# Patient Record
Sex: FEMALE | Race: WHITE | NOT HISPANIC OR LATINO | Employment: UNEMPLOYED | URBAN - METROPOLITAN AREA
[De-identification: names, ages, dates, MRNs, and addresses within clinical notes are randomized per-mention and may not be internally consistent; named-entity substitution may affect disease eponyms.]

---

## 2023-07-01 ENCOUNTER — HOSPITAL ENCOUNTER (EMERGENCY)
Facility: HOSPITAL | Age: 28
Discharge: HOME | End: 2023-07-01
Attending: EMERGENCY MEDICINE | Admitting: EMERGENCY MEDICINE
Payer: COMMERCIAL

## 2023-07-01 VITALS
DIASTOLIC BLOOD PRESSURE: 73 MMHG | TEMPERATURE: 99 F | OXYGEN SATURATION: 98 % | SYSTOLIC BLOOD PRESSURE: 135 MMHG | RESPIRATION RATE: 18 BRPM | HEART RATE: 99 BPM

## 2023-07-01 DIAGNOSIS — T63.441A LOCAL REACTION TO BEE STING: Primary | ICD-10-CM

## 2023-07-01 PROCEDURE — 99284 EMERGENCY DEPT VISIT MOD MDM: CPT | Performed by: PHYSICIAN ASSISTANT

## 2023-07-01 PROCEDURE — 99282 EMERGENCY DEPT VISIT SF MDM: CPT

## 2023-07-01 RX ORDER — CEPHALEXIN 500 MG/1
500 CAPSULE ORAL 4 TIMES DAILY
Qty: 28 CAPSULE | Refills: 0 | Status: SHIPPED | OUTPATIENT
Start: 2023-07-01 | End: 2023-07-08

## 2023-07-02 NOTE — ED PROVIDER NOTES
History  Chief Complaint   Patient presents with   • Bee Sting     Pt arrived ambulatory with c/o being stung on her left arm last Sunday. Pt was concerned that it is irritated again     This is a 77-year-old female patient presenting for evaluation of a bee sting to her left upper extremity. She was stung there 6 days ago, had swelling afterwards which had gotten better until tonight when it seemed to suddenly get worse. She states it was more swollen, red but not painful. It was itchy. The swelling has since improved and looks much better now per her report. She denies any fevers chills nausea vomiting. There is no trauma to the area tonight. There is no repeat sting tonight. She has been resting comfortably since then. She states she just wanted to make sure she was not having a severe allergic reaction as her brother has anaphylaxis to bee stings. History provided by:  Patient   used: No        None       History reviewed. No pertinent past medical history. History reviewed. No pertinent surgical history. History reviewed. No pertinent family history. I have reviewed and agree with the history as documented. E-Cigarette/Vaping     E-Cigarette/Vaping Substances     Social History     Tobacco Use   • Smoking status: Never   • Smokeless tobacco: Never   Substance Use Topics   • Alcohol use: Not Currently   • Drug use: Never       Review of Systems   Constitutional: Negative for chills and fever. HENT: Negative for ear pain and sore throat. Eyes: Negative for pain and visual disturbance. Respiratory: Negative for cough and shortness of breath. Cardiovascular: Negative for chest pain and palpitations. Gastrointestinal: Negative for abdominal pain and vomiting. Genitourinary: Negative for dysuria and hematuria. Musculoskeletal: Negative for arthralgias and back pain. Skin: Positive for rash. Negative for color change and wound.    Neurological: Negative for seizures and syncope. All other systems reviewed and are negative. Physical Exam  Physical Exam  Vitals and nursing note reviewed. Constitutional:       General: She is not in acute distress. Appearance: She is well-developed. HENT:      Head: Normocephalic and atraumatic. Eyes:      Conjunctiva/sclera: Conjunctivae normal.   Cardiovascular:      Rate and Rhythm: Normal rate and regular rhythm. Heart sounds: No murmur heard. Pulmonary:      Effort: Pulmonary effort is normal. No respiratory distress. Breath sounds: Normal breath sounds. Abdominal:      Palpations: Abdomen is soft. Tenderness: There is no abdominal tenderness. Musculoskeletal:         General: No swelling. Cervical back: Neck supple. Skin:     General: Skin is warm and dry. Capillary Refill: Capillary refill takes less than 2 seconds. Neurological:      Mental Status: She is alert. Psychiatric:         Mood and Affect: Mood normal.         Vital Signs  ED Triage Vitals [07/01/23 2157]   Temperature Pulse Respirations Blood Pressure SpO2   99 °F (37.2 °C) 99 18 135/73 98 %      Temp Source Heart Rate Source Patient Position - Orthostatic VS BP Location FiO2 (%)   Oral Monitor Sitting Right arm --      Pain Score       --           Vitals:    07/01/23 2157   BP: 135/73   Pulse: 99   Patient Position - Orthostatic VS: Sitting         Visual Acuity      ED Medications  Medications - No data to display    Diagnostic Studies  Results Reviewed     None                 No orders to display              Procedures  Procedures         ED Course                               SBIRT 20yo+    Flowsheet Row Most Recent Value   Initial Alcohol Screen: US AUDIT-C     1. How often do you have a drink containing alcohol? 0 Filed at: 07/01/2023 2157   2. How many drinks containing alcohol do you have on a typical day you are drinking? 0 Filed at: 07/01/2023 2157   3b. FEMALE Any Age, or MALE 65+:  How often do you have 4 or more drinks on one occassion? 0 Filed at: 07/01/2023 2157   Audit-C Score 0 Filed at: 07/01/2023 2157   NA: How many times in the past year have you. .. Used an illegal drug or used a prescription medication for non-medical reasons? Never Filed at: 07/01/2023 2157                    Medical Decision Making  Differential diagnosis includes but is not limited to allergic reaction, cellulitis, doubt abscess. Plan/medical decision making: Suspect localized skin reaction from bug bite. She has no pain fever and with the rash already improving, doubt infection. We discussed wait-and-see prescription for antibiotics. We discussed continued Benadryl as needed. Discussed return parameters. Patient understands and agrees with this plan. Risk  Prescription drug management. Disposition  Final diagnoses:   Local reaction to bee sting     Time reflects when diagnosis was documented in both MDM as applicable and the Disposition within this note     Time User Action Codes Description Comment    7/1/2023 10:26 PM Iban Fernandez Add [M94.032O] Local reaction to bee sting       ED Disposition     ED Disposition   Discharge    Condition   Stable    Date/Time   Sat Jul 1, 2023 10:26 PM    Comment   Francine Escobar discharge to home/self care. Follow-up Information     Follow up With Specialties Details Why Contact Info    Kody Manley, DO Family Medicine Call  As needed [address] [place] [address]  [phone]            Discharge Medication List as of 7/1/2023 10:26 PM      START taking these medications    Details   cephalexin (Keflex) 500 mg capsule Take 1 capsule (500 mg total) by mouth 4 (four) times a day for 7 days, Starting Sat 7/1/2023, Until Sat 7/8/2023, Print             No discharge procedures on file.     PDMP Review     None          ED Provider  Electronically Signed by           Iban Fernandez PA-C  07/01/23 1694

## 2023-12-14 ENCOUNTER — HOSPITAL ENCOUNTER (EMERGENCY)
Facility: HOSPITAL | Age: 28
Discharge: HOME | End: 2023-12-14
Attending: EMERGENCY MEDICINE
Payer: COMMERCIAL

## 2023-12-14 ENCOUNTER — APPOINTMENT (EMERGENCY)
Dept: RADIOLOGY | Facility: HOSPITAL | Age: 28
End: 2023-12-14
Payer: COMMERCIAL

## 2023-12-14 VITALS
OXYGEN SATURATION: 98 % | HEART RATE: 87 BPM | SYSTOLIC BLOOD PRESSURE: 118 MMHG | TEMPERATURE: 98.4 F | DIASTOLIC BLOOD PRESSURE: 76 MMHG | RESPIRATION RATE: 14 BRPM | WEIGHT: 209 LBS

## 2023-12-14 DIAGNOSIS — R06.02 SOB (SHORTNESS OF BREATH): ICD-10-CM

## 2023-12-14 DIAGNOSIS — R05.9 COUGH: Primary | ICD-10-CM

## 2023-12-14 LAB
ANION GAP SERPL CALCULATED.3IONS-SCNC: 5 MMOL/L
BASOPHILS # BLD AUTO: 0.04 THOUSANDS/ÂΜL (ref 0–0.1)
BASOPHILS NFR BLD AUTO: 1 % (ref 0–1)
BNP SERPL-MCNC: 9 PG/ML (ref 0–100)
BUN SERPL-MCNC: 13 MG/DL (ref 5–25)
CALCIUM SERPL-MCNC: 9.3 MG/DL (ref 8.4–10.2)
CARDIAC TROPONIN I PNL SERPL HS: <2 NG/L
CHLORIDE SERPL-SCNC: 107 MMOL/L (ref 96–108)
CO2 SERPL-SCNC: 27 MMOL/L (ref 21–32)
CREAT SERPL-MCNC: 1.27 MG/DL (ref 0.6–1.3)
D DIMER PPP FEU-MCNC: 0.47 UG/ML FEU
EOSINOPHIL # BLD AUTO: 0.22 THOUSAND/ÂΜL (ref 0–0.61)
EOSINOPHIL NFR BLD AUTO: 3 % (ref 0–6)
ERYTHROCYTE [DISTWIDTH] IN BLOOD BY AUTOMATED COUNT: 13.2 % (ref 11.6–15.1)
EXT PREGNANCY TEST URINE: NEGATIVE
EXT. CONTROL: NORMAL
FLUAV RNA RESP QL NAA+PROBE: NEGATIVE
FLUBV RNA RESP QL NAA+PROBE: NEGATIVE
GFR SERPL CREATININE-BSD FRML MDRD: 57 ML/MIN/1.73SQ M
GLUCOSE SERPL-MCNC: 88 MG/DL (ref 65–140)
HCT VFR BLD AUTO: 41.1 % (ref 34.8–46.1)
HGB BLD-MCNC: 13.8 G/DL (ref 11.5–15.4)
IMM GRANULOCYTES # BLD AUTO: 0.01 THOUSAND/UL (ref 0–0.2)
IMM GRANULOCYTES NFR BLD AUTO: 0 % (ref 0–2)
LYMPHOCYTES # BLD AUTO: 3.01 THOUSANDS/ÂΜL (ref 0.6–4.47)
LYMPHOCYTES NFR BLD AUTO: 34 % (ref 14–44)
MCH RBC QN AUTO: 30.5 PG (ref 26.8–34.3)
MCHC RBC AUTO-ENTMCNC: 33.6 G/DL (ref 31.4–37.4)
MCV RBC AUTO: 91 FL (ref 82–98)
MONOCYTES # BLD AUTO: 0.75 THOUSAND/ÂΜL (ref 0.17–1.22)
MONOCYTES NFR BLD AUTO: 9 % (ref 4–12)
NEUTROPHILS # BLD AUTO: 4.71 THOUSANDS/ÂΜL (ref 1.85–7.62)
NEUTS SEG NFR BLD AUTO: 53 % (ref 43–75)
NRBC BLD AUTO-RTO: 0 /100 WBCS
PLATELET # BLD AUTO: 318 THOUSANDS/UL (ref 149–390)
PMV BLD AUTO: 8.4 FL (ref 8.9–12.7)
POTASSIUM SERPL-SCNC: 3.6 MMOL/L (ref 3.5–5.3)
RBC # BLD AUTO: 4.53 MILLION/UL (ref 3.81–5.12)
RSV RNA RESP QL NAA+PROBE: NEGATIVE
SARS-COV-2 RNA RESP QL NAA+PROBE: NEGATIVE
SODIUM SERPL-SCNC: 139 MMOL/L (ref 135–147)
WBC # BLD AUTO: 8.74 THOUSAND/UL (ref 4.31–10.16)

## 2023-12-14 PROCEDURE — 81025 URINE PREGNANCY TEST: CPT

## 2023-12-14 PROCEDURE — 94640 AIRWAY INHALATION TREATMENT: CPT

## 2023-12-14 PROCEDURE — 85025 COMPLETE CBC W/AUTO DIFF WBC: CPT

## 2023-12-14 PROCEDURE — 83880 ASSAY OF NATRIURETIC PEPTIDE: CPT

## 2023-12-14 PROCEDURE — 80048 BASIC METABOLIC PNL TOTAL CA: CPT

## 2023-12-14 PROCEDURE — 36415 COLL VENOUS BLD VENIPUNCTURE: CPT

## 2023-12-14 PROCEDURE — 96374 THER/PROPH/DIAG INJ IV PUSH: CPT

## 2023-12-14 PROCEDURE — C9113 INJ PANTOPRAZOLE SODIUM, VIA: HCPCS

## 2023-12-14 PROCEDURE — 85379 FIBRIN DEGRADATION QUANT: CPT

## 2023-12-14 PROCEDURE — 84484 ASSAY OF TROPONIN QUANT: CPT

## 2023-12-14 PROCEDURE — 93005 ELECTROCARDIOGRAM TRACING: CPT

## 2023-12-14 PROCEDURE — 99283 EMERGENCY DEPT VISIT LOW MDM: CPT

## 2023-12-14 PROCEDURE — 71046 X-RAY EXAM CHEST 2 VIEWS: CPT

## 2023-12-14 PROCEDURE — 0241U HB NFCT DS VIR RESP RNA 4 TRGT: CPT

## 2023-12-14 RX ORDER — PANTOPRAZOLE SODIUM 40 MG/10ML
40 INJECTION, POWDER, LYOPHILIZED, FOR SOLUTION INTRAVENOUS ONCE
Status: COMPLETED | OUTPATIENT
Start: 2023-12-14 | End: 2023-12-14

## 2023-12-14 RX ORDER — FAMOTIDINE 20 MG/1
20 TABLET, FILM COATED ORAL ONCE
Status: COMPLETED | OUTPATIENT
Start: 2023-12-14 | End: 2023-12-14

## 2023-12-14 RX ORDER — ACETAMINOPHEN 325 MG/1
650 TABLET ORAL ONCE
Status: COMPLETED | OUTPATIENT
Start: 2023-12-14 | End: 2023-12-14

## 2023-12-14 RX ORDER — ALBUTEROL SULFATE 90 UG/1
2 AEROSOL, METERED RESPIRATORY (INHALATION) EVERY 6 HOURS PRN
Qty: 8.5 G | Refills: 0 | Status: SHIPPED | OUTPATIENT
Start: 2023-12-14

## 2023-12-14 RX ORDER — IPRATROPIUM BROMIDE AND ALBUTEROL SULFATE 2.5; .5 MG/3ML; MG/3ML
3 SOLUTION RESPIRATORY (INHALATION) ONCE
Status: COMPLETED | OUTPATIENT
Start: 2023-12-14 | End: 2023-12-14

## 2023-12-14 RX ADMIN — PANTOPRAZOLE SODIUM 40 MG: 40 INJECTION, POWDER, FOR SOLUTION INTRAVENOUS at 22:03

## 2023-12-14 RX ADMIN — ACETAMINOPHEN 650 MG: 325 TABLET, FILM COATED ORAL at 22:01

## 2023-12-14 RX ADMIN — FAMOTIDINE 20 MG: 20 TABLET ORAL at 22:01

## 2023-12-14 RX ADMIN — IPRATROPIUM BROMIDE AND ALBUTEROL SULFATE 3 ML: 2.5; .5 SOLUTION RESPIRATORY (INHALATION) at 22:04

## 2023-12-15 LAB
ATRIAL RATE: 81 BPM
P AXIS: 55 DEGREES
PR INTERVAL: 198 MS
QRS AXIS: 93 DEGREES
QRSD INTERVAL: 76 MS
QT INTERVAL: 352 MS
QTC INTERVAL: 408 MS
T WAVE AXIS: 54 DEGREES
VENTRICULAR RATE: 81 BPM

## 2023-12-15 NOTE — ED PROVIDER NOTES
History  Chief Complaint   Patient presents with    Cough     Pt with dry cough, chest "burning" and sob x 4 days. Denies fevers. Patient is a 49-year-old female who presents today for 4 days of symptoms. Patient reports chest pain described as a burning sensation with associated shortness of breath and dry cough. Reports eating tacos prior to ED arrival. Denies any other symptoms at this time. Has not tried anything for symptom relief. Patient denies sick contacts. Denies cardiac, pulmonary history. Denies history of asthma. Cough  Associated symptoms: chest pain (Burning sensation) and shortness of breath    Associated symptoms: no chills, no ear pain, no fever, no headaches, no rash and no sore throat        None       History reviewed. No pertinent past medical history. History reviewed. No pertinent surgical history. History reviewed. No pertinent family history. I have reviewed and agree with the history as documented. E-Cigarette/Vaping     E-Cigarette/Vaping Substances     Social History     Tobacco Use    Smoking status: Never    Smokeless tobacco: Never   Substance Use Topics    Alcohol use: Yes     Comment: occ    Drug use: Never       Review of Systems   Constitutional:  Negative for chills and fever. HENT:  Negative for ear pain, sore throat, trouble swallowing and voice change. Eyes:  Negative for photophobia, pain and visual disturbance. Respiratory:  Positive for cough and shortness of breath. Cardiovascular:  Positive for chest pain (Burning sensation). Negative for palpitations. Gastrointestinal:  Negative for abdominal pain, nausea and vomiting. Genitourinary:  Negative for dysuria, flank pain and hematuria. Musculoskeletal:  Negative for arthralgias and back pain. Skin:  Negative for color change and rash. Neurological:  Negative for dizziness, seizures, syncope, weakness and headaches. Psychiatric/Behavioral:  Negative for confusion.     All other systems reviewed and are negative. Physical Exam  Physical Exam  Vitals and nursing note reviewed. Constitutional:       General: She is not in acute distress. Appearance: She is well-developed. HENT:      Head: Normocephalic and atraumatic. Mouth/Throat:      Mouth: Mucous membranes are moist.   Eyes:      Conjunctiva/sclera: Conjunctivae normal.   Cardiovascular:      Rate and Rhythm: Regular rhythm. Tachycardia present. Pulses: Normal pulses. Heart sounds: No murmur heard. Pulmonary:      Effort: Pulmonary effort is normal. No respiratory distress. Breath sounds: Decreased air movement (Bilateral) present. Wheezing (Right sided) present. Abdominal:      Palpations: Abdomen is soft. Tenderness: There is no abdominal tenderness. Musculoskeletal:         General: No swelling. Cervical back: Neck supple. Skin:     General: Skin is warm and dry. Capillary Refill: Capillary refill takes less than 2 seconds. Neurological:      Mental Status: She is alert.    Psychiatric:         Mood and Affect: Mood normal.         Vital Signs  ED Triage Vitals   Temperature Pulse Respirations Blood Pressure SpO2   12/14/23 2105 12/14/23 2102 12/14/23 2102 12/14/23 2102 12/14/23 2102   98.4 °F (36.9 °C) 98 20 144/75 98 %      Temp Source Heart Rate Source Patient Position - Orthostatic VS BP Location FiO2 (%)   12/14/23 2105 12/14/23 2102 12/14/23 2102 12/14/23 2102 --   Oral Monitor Sitting Left arm       Pain Score       12/14/23 2201       7           Vitals:    12/14/23 2102 12/14/23 2213   BP: 144/75 116/71   Pulse: 98 76   Patient Position - Orthostatic VS: Sitting Lying         Visual Acuity      ED Medications  Medications   acetaminophen (TYLENOL) tablet 650 mg (650 mg Oral Given 12/14/23 2201)   ipratropium-albuterol (DUO-NEB) 0.5-2.5 mg/3 mL inhalation solution 3 mL (3 mL Nebulization Given 12/14/23 2204)   famotidine (PEPCID) tablet 20 mg (20 mg Oral Given 12/14/23 2201)   pantoprazole (PROTONIX) injection 40 mg (40 mg Intravenous Given 12/14/23 2203)       Diagnostic Studies  Results Reviewed       Procedure Component Value Units Date/Time    FLU/RSV/COVID - if FLU/RSV clinically relevant [[id number]]  (Normal) Collected: 12/14/23 2154    Lab Status: Final result Specimen: Nares from Nose Updated: 12/14/23 2236     SARS-CoV-2 Negative     INFLUENZA A PCR Negative     INFLUENZA B PCR Negative     RSV PCR Negative    Narrative:      FOR PEDIATRIC PATIENTS - copy/paste COVID Guidelines URL to browser: [email or web address]    SARS-CoV-2 assay is a Nucleic Acid Amplification assay intended for the  qualitative detection of nucleic acid from SARS-CoV-2 in nasopharyngeal  swabs. Results are for the presumptive identification of SARS-CoV-2 RNA. Positive results are indicative of infection with SARS-CoV-2, the virus  causing COVID-19, but do not rule out bacterial infection or co-infection  with other viruses. Laboratories within the [facility] hospital and its  territories are required to report all positive results to the appropriate  public health authorities. Negative results do not preclude SARS-CoV-2  infection and should not be used as the sole basis for treatment or other  patient management decisions. Negative results must be combined with  clinical observations, patient history, and epidemiological information. This test has not been FDA cleared or approved. This test has been authorized by FDA under an Emergency Use Authorization  (EUA). This test is only authorized for the duration of time the  declaration that circumstances exist justifying the authorization of the  emergency use of an in vitro diagnostic tests for detection of SARS-CoV-2  virus and/or diagnosis of COVID-19 infection under section 564(b)(1) of  the Act, 21 U. S.C. 678OZC-4(L)(7), unless the authorization is terminated  or revoked sooner.  The test has been validated but independent review by FDA  and CLIA is pending. Test performed using [employer] GeneXpert: This RT-PCR assay targets N2,  a region unique to SARS-CoV-2. A conserved region in the E-gene was chosen  for pan-Sarbecovirus detection which includes SARS-CoV-2. According to CMS-2020-01-R, this platform meets the definition of high-throughput technology.     HS Troponin 0hr (reflex protocol) [[id number]]  (Normal) Collected: 12/14/23 2154    Lab Status: Final result Specimen: Blood from Arm, Right Updated: 12/14/23 2228     hs TnI 0hr <2 ng/L     B-Type Natriuretic Peptide(BNP) [[id number]]  (Normal) Collected: 12/14/23 2154    Lab Status: Final result Specimen: Blood from Arm, Right Updated: 12/14/23 2228     BNP 9 pg/mL     Basic metabolic panel [[id number]] Collected: 12/14/23 2154    Lab Status: Final result Specimen: Blood from Arm, Right Updated: 12/14/23 2217     Sodium 139 mmol/L      Potassium 3.6 mmol/L      Chloride 107 mmol/L      CO2 27 mmol/L      ANION GAP 5 mmol/L      BUN 13 mg/dL      Creatinine 1.27 mg/dL      Glucose 88 mg/dL      Calcium 9.3 mg/dL      eGFR 57 ml/min/1.73sq m     Narrative:      [place] guidelines for Chronic Kidney Disease (CKD):     Stage 1 with normal or high GFR (GFR > 90 mL/min/1.73 square meters)    Stage 2 Mild CKD (GFR = 60-89 mL/min/1.73 square meters)    Stage 3A Moderate CKD (GFR = 45-59 mL/min/1.73 square meters)    Stage 3B Moderate CKD (GFR = 30-44 mL/min/1.73 square meters)    Stage 4 Severe CKD (GFR = 15-29 mL/min/1.73 square meters)    Stage 5 End Stage CKD (GFR <15 mL/min/1.73 square meters)  Note: GFR calculation is accurate only with a steady state creatinine    D-Dimer [[id number]]  (Normal) Collected: 12/14/23 2154    Lab Status: Final result Specimen: Blood from Arm, Right Updated: 12/14/23 2217     D-Dimer, Quant 0.47 ug/ml FEU     POCT pregnancy, urine [[id number]]  (Normal) Resulted: 12/14/23 9082    Lab Status: Final result Updated: 12/14/23 2209     EXT Preg Test, Ur Negative     Control Valid    CBC and differential [[id number]]  (Abnormal) Collected: 12/14/23 2154    Lab Status: Final result Specimen: Blood from Arm, Right Updated: 12/14/23 2159     WBC 8.74 Thousand/uL      RBC 4.53 Million/uL      Hemoglobin 13.8 g/dL      Hematocrit 41.1 %      MCV 91 fL      MCH 30.5 pg      MCHC 33.6 g/dL      RDW 13.2 %      MPV 8.4 fL      Platelets 441 Thousands/uL      nRBC 0 /100 WBCs      Neutrophils Relative 53 %      Immat GRANS % 0 %      Lymphocytes Relative 34 %      Monocytes Relative 9 %      Eosinophils Relative 3 %      Basophils Relative 1 %      Neutrophils Absolute 4.71 Thousands/µL      Immature Grans Absolute 0.01 Thousand/uL      Lymphocytes Absolute 3.01 Thousands/µL      Monocytes Absolute 0.75 Thousand/µL      Eosinophils Absolute 0.22 Thousand/µL      Basophils Absolute 0.04 Thousands/µL                    XR chest 2 views   ED Interpretation by Liset Salazar PA-C (12/14 2150)   No acute cardiopulmonary abnormality                 Procedures  ECG 12 Lead Documentation Only    Date/Time: 12/14/2023 9:08 PM    Performed by: Liset Salazar PA-C  Authorized by: Liset Salazar PA-C    Indications / Diagnosis:  Chest pain, SOB  ECG reviewed by me, the ED Provider: yes    Patient location:  ED  Previous ECG:     Previous ECG:  Unavailable  Interpretation:     Interpretation: normal    Rate:     ECG rate:  81    ECG rate assessment: normal    Rhythm:     Rhythm: sinus rhythm    ST segments:     ST segments:  Normal  T waves:     T waves: normal             ED Course  ED Course as of 12/14/23 2318   Thu Dec 14, 2023   2222 D-Dimer, Quant: 0.47   2229 hs TnI 0hr: <2   2300 On reevaluation, patient reports symptomatic improvement.                HEART Risk Score      Flowsheet Row Most Recent Value   Heart Score Risk Calculator    History 0 Filed at: 12/14/2023 2230   ECG 0 Filed at: 12/14/2023 2230   Age 0 Filed at: 12/14/2023 2230   Risk Factors 1 Filed at: 12/14/2023 2230   Troponin 0 Filed at: 12/14/2023 2230   HEART Score 1 Filed at: 12/14/2023 2230                          SBIRT 22yo+      Flowsheet Row Most Recent Value   Initial Alcohol Screen: US AUDIT-C     1. How often do you have a drink containing alcohol? 1 Filed at: 12/14/2023 2211   2. How many drinks containing alcohol do you have on a typical day you are drinking? 1 Filed at: 12/14/2023 2211   3b. FEMALE Any Age, or MALE 65+: How often do you have 4 or more drinks on one occassion? 1 Filed at: 12/14/2023 2211   Audit-C Score 3 Filed at: 12/14/2023 2211   NA: How many times in the past year have you. .. Used an illegal drug or used a prescription medication for non-medical reasons? Never Filed at: 12/14/2023 2211                      Medical Decision Making  Patient is a 30-year-old female who presents today for burning chest pain, shortness of breath and dry cough. Vital signs stable. On exam, patient has tachycardia present with associated decreased air movement bilaterally. Patient has right-sided wheezing. Lab work and chest x-ray unremarkable. EKG unremarkable. Patient given DuoNeb, Protonix, Tylenol Pepcid with significant symptom improvement on reevaluation. Patient to be discharged with PCP follow-up. Given prescription for albuterol as needed for wheezing. Patient to return to emergency room for any worsening symptoms. Patient with understands and agrees to discharge plan. Problems Addressed:  Cough: acute illness or injury  SOB (shortness of breath): acute illness or injury    Amount and/or Complexity of Data Reviewed  Labs: ordered. Decision-making details documented in ED Course. Radiology: ordered and independent interpretation performed. Risk  OTC drugs. Prescription drug management.              Disposition  Final diagnoses:   Cough   SOB (shortness of breath)     Time reflects when diagnosis was documented in both MDM as applicable and the Disposition within this note       Time User Action Codes Description Comment    12/14/2023 11:06 PM Nanci Delgado Add [R05.9] Cough     12/14/2023 11:06 PM Nanci Delgado Add [R06.02] SOB (shortness of breath)           ED Disposition       ED Disposition   Discharge    Condition   Stable    Date/Time   Thu Dec 14, 2023 2306    Comment   Presley Bargerronald discharge to home/self care. Follow-up Information       Follow up With Specialties Details Why Contact Info Additional Information    Deangelo Singh,  Family Medicine   [facility]  [facility] [address] Emergency Department Emergency Medicine Go to  If symptoms worsen [address] Emergency Department, [place], [place], [address]            Patient's Medications   Discharge Prescriptions    ALBUTEROL (PROAIR HFA) 90 MCG/ACT INHALER    Inhale 2 puffs every 6 (six) hours as needed for wheezing       Start Date: 12/14/2023End Date: --       Order Dose: 2 puffs       Quantity: 8.5 g    Refills: 0       No discharge procedures on file.     PDMP Review       None            ED Provider  Electronically Signed by             Jacinto Nguyen PA-C  12/14/23 3390

## 2023-12-15 NOTE — DISCHARGE INSTRUCTIONS
Use albuterol as needed. Take over-the-counter symptoms for relief. You can take over-the-counter Pepcid for the burning sensation you reported. Please follow-up with your primary care. Please return to the emergency room for any worsening symptoms.

## 2024-01-03 ENCOUNTER — APPOINTMENT (OUTPATIENT)
Dept: RADIOLOGY | Facility: CLINIC | Age: 29
End: 2024-01-03
Payer: COMMERCIAL

## 2024-01-03 ENCOUNTER — OFFICE VISIT (OUTPATIENT)
Dept: URGENT CARE | Facility: CLINIC | Age: 29
End: 2024-01-03
Payer: COMMERCIAL

## 2024-01-03 VITALS
TEMPERATURE: 97.7 F | RESPIRATION RATE: 20 BRPM | HEART RATE: 101 BPM | WEIGHT: 202 LBS | DIASTOLIC BLOOD PRESSURE: 78 MMHG | SYSTOLIC BLOOD PRESSURE: 110 MMHG | OXYGEN SATURATION: 98 %

## 2024-01-03 DIAGNOSIS — R05.1 ACUTE COUGH: ICD-10-CM

## 2024-01-03 DIAGNOSIS — J20.9 ACUTE BRONCHITIS, UNSPECIFIED ORGANISM: Primary | ICD-10-CM

## 2024-01-03 PROBLEM — R53.83 OTHER FATIGUE: Status: ACTIVE | Noted: 2017-12-19

## 2024-01-03 PROBLEM — L70.0 ACNE VULGARIS: Status: ACTIVE | Noted: 2017-12-19

## 2024-01-03 PROBLEM — K42.9 UMBILICAL HERNIA: Status: ACTIVE | Noted: 2017-11-27

## 2024-01-03 PROCEDURE — 71046 X-RAY EXAM CHEST 2 VIEWS: CPT

## 2024-01-03 PROCEDURE — 99213 OFFICE O/P EST LOW 20 MIN: CPT

## 2024-01-03 RX ORDER — AZITHROMYCIN 250 MG/1
TABLET, FILM COATED ORAL
Qty: 6 TABLET | Refills: 0 | Status: SHIPPED | OUTPATIENT
Start: 2024-01-03 | End: 2024-01-07

## 2024-01-03 RX ORDER — PREDNISONE 20 MG/1
40 TABLET ORAL DAILY
Qty: 10 TABLET | Refills: 0 | Status: SHIPPED | OUTPATIENT
Start: 2024-01-03 | End: 2024-01-08

## 2024-01-03 RX ORDER — METRONIDAZOLE 500 MG/1
TABLET ORAL
COMMUNITY
Start: 2023-11-14

## 2024-01-03 RX ORDER — BENZONATATE 100 MG/1
100 CAPSULE ORAL 3 TIMES DAILY PRN
Qty: 20 CAPSULE | Refills: 0 | Status: SHIPPED | OUTPATIENT
Start: 2024-01-03

## 2024-01-03 NOTE — PATIENT INSTRUCTIONS
Take antibiotic and steroids as directed for the next 5 days. Complete entire course of therapy even if symptoms improve and take medication with food to avoid upset stomach. Continue inhaler as previously prescribed. Follow-up with PCP in 3-5 days if no improvement of symptoms. Report to the ER sooner if symptoms worsen.

## 2024-01-03 NOTE — PROGRESS NOTES
[facility]        NAME: Chris Gomez is a 28 y.o. female  : 1995    MRN: [id number]  DATE: January 3, 2024  TIME: 6:07 PM    Assessment and Plan   Acute bronchitis, unspecified organism [J20.9]  1. Acute bronchitis, unspecified organism  azithromycin (ZITHROMAX) 250 mg tablet    predniSONE 20 mg tablet    benzonatate (TESSALON PERLES) 100 mg capsule      2. Acute cough  XR chest pa & lateral        No acute pneumonia on x-ray per provider read, will follow-up with final read by radiologist if findings are concerning. Suspect bacterial bronchitis given clinical presentation. Antibiotics, steroids, and tessalon perles as directed. Patient has inhaler at home from her ER visit. VSS in clinic, appears in no acute distress. Educated on use of OTC products for additional relief of symptoms. Advised close follow-up with PCP or to report to the ER if symptoms worsen. Patient verbalizes understanding and agreeable to plan.       Patient Instructions     Take antibiotic and steroids as directed for the next 5 days. Complete entire course of therapy even if symptoms improve and take medication with food to avoid upset stomach. Continue inhaler as previously prescribed. Follow-up with PCP in 3-5 days if no improvement of symptoms. Report to the ER sooner if symptoms worsen.        Chief Complaint     Chief Complaint   Patient presents with    Cough     Dry cough for the last 3-4 weeks. Was treated with a nebulizer in the hospital about a month ago but the cough persists and hasn't gone away. Last night cough got worse and not experiencing left lateral sided sharp pain and right jaw pain. Uses the inhaler only when the coughing is really bad and tightness in chest. Last dose was yesterday. Cough is worse at night         History of Present Illness       28 year old female presents for evaluation of cough and congestion for the past 3-4 weeks. She was seen in the ER last month and diagnosed with a viral  illness. A chest x-ray was done at that time and she was given an inhaler. He reports occasional shortness of breath but denies fevers or shortness of breath. She has been using her inhaler as prescribed with minimal improvement of symptoms.     Cough  This is a recurrent problem. The current episode started 1 to 4 weeks ago. The problem has been unchanged. The problem occurs every few minutes. The cough is Non-productive. Associated symptoms include shortness of breath and wheezing. Pertinent negatives include no chest pain, chills, ear congestion, ear pain, fever, headaches, heartburn, hemoptysis, myalgias, nasal congestion, postnasal drip, rash, rhinorrhea, sore throat, sweats or weight loss. The symptoms are aggravated by lying down. She has tried a beta-agonist inhaler for the symptoms. The treatment provided mild relief. There is no history of asthma or environmental allergies.       Review of Systems   Review of Systems   Constitutional:  Negative for activity change, appetite change, chills, fatigue, fever and weight loss.   HENT:  Positive for congestion. Negative for ear pain, postnasal drip, rhinorrhea, sinus pressure, sinus pain, sneezing, sore throat and trouble swallowing.    Eyes:  Negative for visual disturbance.   Respiratory:  Positive for cough, shortness of breath and wheezing. Negative for hemoptysis and chest tightness.    Cardiovascular:  Negative for chest pain.   Gastrointestinal:  Negative for abdominal pain, constipation, diarrhea, heartburn, nausea and vomiting.   Musculoskeletal:  Negative for arthralgias, back pain, myalgias and neck pain.   Skin:  Negative for rash.   Allergic/Immunologic: Negative for environmental allergies and food allergies.   Neurological:  Negative for dizziness, light-headedness and headaches.         Current Medications       Current Outpatient Medications:     albuterol (ProAir HFA) 90 mcg/act inhaler, Inhale 2 puffs every 6 (six) hours as needed for wheezing,  Disp: 8.5 g, Rfl: 0    azithromycin (ZITHROMAX) 250 mg tablet, Take 2 tablets today then 1 tablet daily x 4 days, Disp: 6 tablet, Rfl: 0    benzonatate (TESSALON PERLES) 100 mg capsule, Take 1 capsule (100 mg total) by mouth 3 (three) times a day as needed for cough, Disp: 20 capsule, Rfl: 0    metroNIDAZOLE (FLAGYL) 500 mg tablet, TAKE 1 TABLET BY MOUTH TWICE A DAY FOR 7 DAYS NO ALCOHOL DURING AND UP TO 1 DAY AFTER USE, Disp: , Rfl:     predniSONE 20 mg tablet, Take 2 tablets (40 mg total) by mouth daily for 5 days, Disp: 10 tablet, Rfl: 0    Current Allergies     Allergies as of 01/03/2024 - Reviewed 01/03/2024   Allergen Reaction Noted    Augmentin [amoxicillin-pot clavulanate] Other (See Comments) 12/14/2023            The following portions of the patient's history were reviewed and updated as appropriate: allergies, current medications, past family history, past medical history, past social history, past surgical history and problem list.     History reviewed. No pertinent past medical history.    History reviewed. No pertinent surgical history.    History reviewed. No pertinent family history.      Medications have been verified.        Objective   /78   Pulse 101   Temp 97.7 °F (36.5 °C)   Resp 20   Wt 91.6 kg (202 lb)   LMP 12/24/2023 (Approximate)   SpO2 98%        Physical Exam     Physical Exam  Vitals and nursing note reviewed.   Constitutional:       General: She is awake. She is not in acute distress.     Appearance: Normal appearance. She is overweight.   HENT:      Head: Normocephalic and atraumatic.      Right Ear: Hearing, ear canal and external ear normal. A middle ear effusion is present.      Left Ear: Hearing, tympanic membrane, ear canal and external ear normal.      Nose: Congestion present. No rhinorrhea.      Right Turbinates: Enlarged. Not swollen or pale.      Left Turbinates: Enlarged. Not swollen or pale.      Right Sinus: No maxillary sinus tenderness or frontal sinus  tenderness.      Left Sinus: No maxillary sinus tenderness or frontal sinus tenderness.      Mouth/Throat:      Lips: Pink.      Mouth: Mucous membranes are moist.      Pharynx: Oropharynx is clear. Uvula midline. No oropharyngeal exudate or posterior oropharyngeal erythema.   Eyes:      Conjunctiva/sclera: Conjunctivae normal.   Cardiovascular:      Rate and Rhythm: Normal rate and regular rhythm.      Pulses: Normal pulses.      Heart sounds: Normal heart sounds.   Pulmonary:      Breath sounds: Examination of the right-lower field reveals decreased breath sounds. Examination of the left-lower field reveals decreased breath sounds. Decreased breath sounds present.   Musculoskeletal:      Cervical back: Full passive range of motion without pain, normal range of motion and neck supple.   Lymphadenopathy:      Cervical: No cervical adenopathy.   Skin:     General: Skin is warm and dry.   Neurological:      General: No focal deficit present.      Mental Status: She is alert and oriented to person, place, and time.   Psychiatric:         Mood and Affect: Mood normal.         Behavior: Behavior normal. Behavior is cooperative.         Thought Content: Thought content normal.         Judgment: Judgment normal.

## 2024-01-03 NOTE — LETTER
January 3, 2024     Patient: Chris Gomez   YOB: 1995   Date of Visit: 1/3/2024       To Whom it May Concern:    Chris Gomez was seen in my clinic on 1/3/2024. She may return to work on 1/4/2024 .    If you have any questions or concerns, please don't hesitate to call.         Sincerely,          DONAVAN Magana        CC: No Recipients

## 2024-09-21 ENCOUNTER — HOSPITAL ENCOUNTER (EMERGENCY)
Facility: HOSPITAL | Age: 29
Discharge: HOME | End: 2024-09-21
Attending: EMERGENCY MEDICINE
Payer: COMMERCIAL

## 2024-09-21 ENCOUNTER — APPOINTMENT (EMERGENCY)
Dept: ULTRASOUND IMAGING | Facility: HOSPITAL | Age: 29
End: 2024-09-21
Payer: COMMERCIAL

## 2024-09-21 VITALS
HEART RATE: 96 BPM | RESPIRATION RATE: 17 BRPM | SYSTOLIC BLOOD PRESSURE: 134 MMHG | TEMPERATURE: 98.9 F | BODY MASS INDEX: 34.66 KG/M2 | WEIGHT: 208 LBS | OXYGEN SATURATION: 99 % | DIASTOLIC BLOOD PRESSURE: 87 MMHG | HEIGHT: 65 IN

## 2024-09-21 DIAGNOSIS — O46.90 VAGINAL BLEEDING DURING PREGNANCY: ICD-10-CM

## 2024-09-21 DIAGNOSIS — Z34.90 PREGNANCY: Primary | ICD-10-CM

## 2024-09-21 LAB
ABO GROUP BLD: NORMAL
ABO GROUP BLD: NORMAL
ALBUMIN SERPL BCG-MCNC: 4.1 G/DL (ref 3.5–5)
ALP SERPL-CCNC: 42 U/L (ref 34–104)
ALT SERPL W P-5'-P-CCNC: 13 U/L (ref 7–52)
ANION GAP SERPL CALCULATED.3IONS-SCNC: 6 MMOL/L (ref 4–13)
APTT PPP: 27 SECONDS (ref 23–34)
AST SERPL W P-5'-P-CCNC: 13 U/L (ref 13–39)
B-HCG SERPL-ACNC: 4993.6 MIU/ML (ref 0–5)
BACTERIA UR QL AUTO: ABNORMAL /HPF
BASOPHILS # BLD AUTO: 0.04 THOUSANDS/ΜL (ref 0–0.1)
BASOPHILS NFR BLD AUTO: 0 % (ref 0–1)
BILIRUB SERPL-MCNC: 0.41 MG/DL (ref 0.2–1)
BILIRUB UR QL STRIP: NEGATIVE
BLD GP AB SCN SERPL QL: NEGATIVE
BUN SERPL-MCNC: 10 MG/DL (ref 5–25)
CALCIUM SERPL-MCNC: 8.7 MG/DL (ref 8.4–10.2)
CHLORIDE SERPL-SCNC: 105 MMOL/L (ref 96–108)
CLARITY UR: CLEAR
CO2 SERPL-SCNC: 23 MMOL/L (ref 21–32)
COLOR UR: YELLOW
CREAT SERPL-MCNC: 0.78 MG/DL (ref 0.6–1.3)
EOSINOPHIL # BLD AUTO: 0.11 THOUSAND/ΜL (ref 0–0.61)
EOSINOPHIL NFR BLD AUTO: 1 % (ref 0–6)
ERYTHROCYTE [DISTWIDTH] IN BLOOD BY AUTOMATED COUNT: 13.4 % (ref 11.6–15.1)
EXT PREGNANCY TEST URINE: POSITIVE
EXT. CONTROL: ABNORMAL
GFR SERPL CREATININE-BSD FRML MDRD: 103 ML/MIN/1.73SQ M
GLUCOSE SERPL-MCNC: 102 MG/DL (ref 65–140)
GLUCOSE UR STRIP-MCNC: NEGATIVE MG/DL
HCT VFR BLD AUTO: 38.1 % (ref 34.8–46.1)
HGB BLD-MCNC: 12.9 G/DL (ref 11.5–15.4)
HGB UR QL STRIP.AUTO: ABNORMAL
IMM GRANULOCYTES # BLD AUTO: 0.02 THOUSAND/UL (ref 0–0.2)
IMM GRANULOCYTES NFR BLD AUTO: 0 % (ref 0–2)
INR PPP: 1.05 (ref 0.85–1.19)
KETONES UR STRIP-MCNC: ABNORMAL MG/DL
LEUKOCYTE ESTERASE UR QL STRIP: NEGATIVE
LYMPHOCYTES # BLD AUTO: 3.14 THOUSANDS/ΜL (ref 0.6–4.47)
LYMPHOCYTES NFR BLD AUTO: 32 % (ref 14–44)
MCH RBC QN AUTO: 29.9 PG (ref 26.8–34.3)
MCHC RBC AUTO-ENTMCNC: 33.9 G/DL (ref 31.4–37.4)
MCV RBC AUTO: 88 FL (ref 82–98)
MONOCYTES # BLD AUTO: 0.81 THOUSAND/ΜL (ref 0.17–1.22)
MONOCYTES NFR BLD AUTO: 8 % (ref 4–12)
MUCOUS THREADS UR QL AUTO: ABNORMAL
NEUTROPHILS # BLD AUTO: 5.78 THOUSANDS/ΜL (ref 1.85–7.62)
NEUTS SEG NFR BLD AUTO: 59 % (ref 43–75)
NITRITE UR QL STRIP: NEGATIVE
NON-SQ EPI CELLS URNS QL MICRO: ABNORMAL /HPF
NRBC BLD AUTO-RTO: 0 /100 WBCS
PH UR STRIP.AUTO: 6 [PH]
PLATELET # BLD AUTO: 279 THOUSANDS/UL (ref 149–390)
PMV BLD AUTO: 8.3 FL (ref 8.9–12.7)
POTASSIUM SERPL-SCNC: 3.5 MMOL/L (ref 3.5–5.3)
PROT SERPL-MCNC: 7.1 G/DL (ref 6.4–8.4)
PROT UR STRIP-MCNC: ABNORMAL MG/DL
PROTHROMBIN TIME: 14.4 SECONDS (ref 12.3–15)
RBC # BLD AUTO: 4.31 MILLION/UL (ref 3.81–5.12)
RBC #/AREA URNS AUTO: ABNORMAL /HPF
RH BLD: POSITIVE
RH BLD: POSITIVE
SODIUM SERPL-SCNC: 134 MMOL/L (ref 135–147)
SP GR UR STRIP.AUTO: 1.03 (ref 1–1.03)
SPECIMEN EXPIRATION DATE: NORMAL
UROBILINOGEN UR STRIP-ACNC: <2 MG/DL
WBC # BLD AUTO: 9.9 THOUSAND/UL (ref 4.31–10.16)
WBC #/AREA URNS AUTO: ABNORMAL /HPF

## 2024-09-21 PROCEDURE — 86850 RBC ANTIBODY SCREEN: CPT

## 2024-09-21 PROCEDURE — 85610 PROTHROMBIN TIME: CPT

## 2024-09-21 PROCEDURE — 99284 EMERGENCY DEPT VISIT MOD MDM: CPT

## 2024-09-21 PROCEDURE — 85025 COMPLETE CBC W/AUTO DIFF WBC: CPT

## 2024-09-21 PROCEDURE — 99285 EMERGENCY DEPT VISIT HI MDM: CPT

## 2024-09-21 PROCEDURE — 86901 BLOOD TYPING SEROLOGIC RH(D): CPT

## 2024-09-21 PROCEDURE — 85730 THROMBOPLASTIN TIME PARTIAL: CPT

## 2024-09-21 PROCEDURE — 76815 OB US LIMITED FETUS(S): CPT

## 2024-09-21 PROCEDURE — 81001 URINALYSIS AUTO W/SCOPE: CPT

## 2024-09-21 PROCEDURE — 80053 COMPREHEN METABOLIC PANEL: CPT

## 2024-09-21 PROCEDURE — 36415 COLL VENOUS BLD VENIPUNCTURE: CPT

## 2024-09-21 PROCEDURE — 87086 URINE CULTURE/COLONY COUNT: CPT

## 2024-09-21 PROCEDURE — 86900 BLOOD TYPING SEROLOGIC ABO: CPT

## 2024-09-21 PROCEDURE — 84702 CHORIONIC GONADOTROPIN TEST: CPT

## 2024-09-21 PROCEDURE — 81025 URINE PREGNANCY TEST: CPT

## 2024-09-21 NOTE — DISCHARGE INSTRUCTIONS
Follow-up with PCP and OB/GYN.  Have beta quantitative blood test repeated in 48 hours.  Continue to monitor symptoms at home.  If any symptoms worsen or new symptoms appear return to the ER.

## 2024-09-21 NOTE — ED PROVIDER NOTES
1. Pregnancy    2. Vaginal bleeding during pregnancy      ED Disposition       ED Disposition   Discharge    Condition   Stable    Date/Time   Sat Sep 21, 2024  5:07 AM    Comment   Chris Gomez discharge to home/self care.                   Assessment & Plan       Medical Decision Making  The patient is a 28 y.o. female who presents to [facility] with a chief complaint of vaginal spotting. Symptoms began a few hours ago and have been constant since onset.  Ddx includes but is not limited to threatened miscarriage, subchorionic hemorrhage, ectopic pregnancy, molar pregnancy   Patient is well-appearing and in no acute distress however is slightly tender to the lower abdominal region.  Patient states that she has been spotting.  Patient with no previous documented IUP on ultrasound.  Lab work unremarkable.  Pregnancy test positive and beta quant is 4993. Ultrasound showed 1.  Left adnexal finding as described, most likely intra-ovarian, however heterotopic pregnancy is not excluded, recommend close clinical follow-up to include ultrasound.  2.  Intrauterine gestational sac with internal yolk sac, however no fetal pole. Recommend continued clinical follow-up to include ultrasound  Discussed with OBGYN on call Dr. Trammell who recommended close outpt follow up. Patient will have repeat beta quant done in 48 hrs. She will call OBGYN first thing in the morning. Discussed strict return parameters. Prior to discharge, discharge instructions were discussed with patient at bedside. Patient was provided both verbal and written instructions. Patient is understanding of the discharge instructions and is agreeable to plan of care. Return precautions were discussed with patient bedside, patient verbalized understanding of signs and symptoms that would necessitate return to the ED. All questions were answered. Patient was comfortable with the plan of care and discharged to home.       Problems  Addressed:  Pregnancy: acute illness or injury  Vaginal bleeding during pregnancy: acute illness or injury    Amount and/or Complexity of Data Reviewed  Labs: ordered. Decision-making details documented in ED Course.  Radiology: ordered. Decision-making details documented in ED Course.                ED Course as of 09/21/24 0635   Sat Sep 21, 2024   0206 PREGNANCY TEST URINE(!): Positive   0456 US OB pregnancy limited with transvaginal  1.  Left adnexal finding as described, most likely intra-ovarian, however heterotopic pregnancy is not excluded, recommend close clinical follow-up to include ultrasound.  2.  Intrauterine gestational sac with internal yolk sac, however no fetal pole. Recommend continued clinical follow-up to include ultrasound         Medications - No data to display    History of Present Illness       The patient is a 28 y.o. female who presents to [facility] with a chief complaint of vaginal spotting. Symptoms began a few hours ago and have been constant since onset. Her pain is currently rated as a 4/10 in severity and described as crampy without radiation. Associated symptoms include abdominal pain. Symptoms are aggravated with none noted and alleviating factors include none noted. The patient denies fever, chills, night sweats, chest pain, shortness of breath, wheezing, cough, sputum, syncope, lightness, dizziness, numbness, tingling, dysuria, particular urgency, hesitancy. No other reported symptoms at this time.  Patient affirms allergies to Augmentin        History provided by:  Patient   used: No        Review of Systems   Constitutional:  Negative for chills and fever.   HENT:  Negative for ear pain and sore throat.    Eyes:  Negative for pain and visual disturbance.   Respiratory:  Negative for cough and shortness of breath.    Cardiovascular:  Negative for chest pain and palpitations.   Gastrointestinal:  Positive for abdominal pain. Negative for  vomiting.   Genitourinary:  Positive for vaginal bleeding. Negative for dysuria and hematuria.   Musculoskeletal:  Negative for arthralgias and back pain.   Skin:  Negative for color change and rash.   Neurological:  Negative for seizures and syncope.   All other systems reviewed and are negative.          Objective     ED Triage Vitals [09/21/24 0048]   Temperature Pulse Blood Pressure Respirations SpO2 Patient Position - Orthostatic VS   98.9 °F (37.2 °C) 96 134/87 17 99 % Sitting      Temp Source Heart Rate Source BP Location FiO2 (%) Pain Score    Oral Monitor Left arm -- --        Physical Exam  Vitals reviewed.   Constitutional:       General: She is not in acute distress.     Appearance: She is not ill-appearing, toxic-appearing or diaphoretic.   HENT:      Head: Normocephalic.      Nose: Nose normal.      Mouth/Throat:      Pharynx: Oropharynx is clear.   Eyes:      General: No scleral icterus.     Conjunctiva/sclera: Conjunctivae normal.   Cardiovascular:      Rate and Rhythm: Normal rate.      Pulses: Normal pulses.   Pulmonary:      Effort: Pulmonary effort is normal. No respiratory distress.      Breath sounds: Normal breath sounds. No stridor. No wheezing, rhonchi or rales.   Abdominal:      General: Abdomen is flat. Bowel sounds are normal.      Palpations: Abdomen is soft.      Tenderness: There is abdominal tenderness in the right lower quadrant, suprapubic area and left lower quadrant. There is no right CVA tenderness, left CVA tenderness, guarding or rebound. Negative signs include Mendez's sign, McBurney's sign and obturator sign.   Musculoskeletal:         General: Normal range of motion.      Cervical back: Normal range of motion and neck supple.      Right lower leg: No edema.      Left lower leg: No edema.   Skin:     General: Skin is warm and dry.      Capillary Refill: Capillary refill takes less than 2 seconds.      Coloration: Skin is not jaundiced.      Findings: No bruising or lesion.    Neurological:      Mental Status: She is alert and oriented to person, place, and time. Mental status is at baseline.         Labs Reviewed   CBC AND DIFFERENTIAL - Abnormal       Result Value    WBC 9.90      RBC 4.31      Hemoglobin 12.9      Hematocrit 38.1      MCV 88      MCH 29.9      MCHC 33.9      RDW 13.4      MPV 8.3 (*)     Platelets 279      nRBC 0      Segmented % 59      Immature Grans % 0      Lymphocytes % 32      Monocytes % 8      Eosinophils Relative 1      Basophils Relative 0      Absolute Neutrophils 5.78      Absolute Immature Grans 0.02      Absolute Lymphocytes 3.14      Absolute Monocytes 0.81      Eosinophils Absolute 0.11      Basophils Absolute 0.04     COMPREHENSIVE METABOLIC PANEL - Abnormal    Sodium 134 (*)     Potassium 3.5      Chloride 105      CO2 23      ANION GAP 6      BUN 10      Creatinine 0.78      Glucose 102      Calcium 8.7      AST 13      ALT 13      Alkaline Phosphatase 42      Total Protein 7.1      Albumin 4.1      Total Bilirubin 0.41      eGFR 103      Narrative:     National Kidney Disease Foundation guidelines for Chronic Kidney Disease (CKD):     Stage 1 with normal or high GFR (GFR > 90 mL/min/1.73 square meters)    Stage 2 Mild CKD (GFR = 60-89 mL/min/1.73 square meters)    Stage 3A Moderate CKD (GFR = 45-59 mL/min/1.73 square meters)    Stage 3B Moderate CKD (GFR = 30-44 mL/min/1.73 square meters)    Stage 4 Severe CKD (GFR = 15-29 mL/min/1.73 square meters)    Stage 5 End Stage CKD (GFR <15 mL/min/1.73 square meters)  Note: GFR calculation is accurate only with a steady state creatinine   UA W REFLEX TO MICROSCOPIC WITH REFLEX TO CULTURE - Abnormal    Color, UA Yellow      Clarity, UA Clear      Specific Gravity, UA 1.030      pH, UA 6.0      Leukocytes, UA Negative      Nitrite, UA Negative      Protein, UA Trace (*)     Glucose, UA Negative      Ketones, UA 40 (2+) (*)     Urobilinogen, UA <2.0      Bilirubin, UA Negative      Occult Blood, UA Moderate  (*)     URINE COMMENT       HCG, QUANTITATIVE - Abnormal    HCG, Quant 4,993.6 (*)     Narrative:      Expected Ranges:    HCG results between 5.0 and 25.0 mIU/mL may be indicative of early pregnancy but should be interpreted in light of the total clinical presentation.    HCG can rise to detectable levels in [place] and post menopausal women (0-11.6 mIU/mL).     Approximate               Approximate HCG  Gestation age          Concentration ( mIU/mL)  _____________          ______________________   Weeks                      HCG values  0.2-1                       5-50  1-2                           2-3                         100-5000  3-4                         500-36451  4-5                         1000-65466  5-6                         [id number]  6-8                         [id number]  8-12                        [id number]     URINE MICROSCOPIC - Abnormal    RBC, UA 1-2      WBC, UA 1-2      Epithelial Cells Occasional      Bacteria, UA None Seen      MUCUS THREADS Occasional (*)     URINE COMMENT       POCT PREGNANCY, URINE - Abnormal    EXT Preg Test, Ur Positive (*)     Control Valid     PROTIME-INR - Normal    Protime 14.4      INR 1.05      Narrative:     INR Therapeutic Range    Indication                                             INR Range      Atrial Fibrillation                                               2.0-3.0  Hypercoagulable State                                    2.0.2.3  Left Ventricular Asist Device                            2.0-3.0  Mechanical Heart Valve                                  -    Aortic(with afib, MI, embolism, HF, LA enlargement,    and/or coagulopathy)                                     2.0-3.0 (2.5-3.5)     Mitral                                                             2.5-3.5  Prosthetic/Bioprosthetic Heart Valve               2.0-3.0  Venous thromboembolism (VTE: VT, PE        2.0-3.0   APTT - Normal    PTT 27     URINE CULTURE   TYPE AND SCREEN    ABO  Grouping O      Rh Factor Positive      Antibody Screen Negative      Specimen Expiration Date 20240924     ABORH RECHECK    ABO Grouping O      Rh Factor Positive       US OB pregnancy limited with transvaginal   Final Interpretation by Yg Middleton MD (09/21 0452)      1.  Left adnexal finding as described, most likely intra-ovarian, however heterotopic pregnancy is not excluded, recommend close clinical follow-up to include ultrasound.   2.  Intrauterine gestational sac with internal yolk sac, however no fetal pole. Recommend continued clinical follow-up to include ultrasound      The study was marked in EPIC for immediate notification.         Workstation performed: [id number]             Procedures    ED Medication and Procedure Management   Prior to Admission Medications   Prescriptions Last Dose Informant Patient Reported? Taking?   albuterol (ProAir HFA) 90 mcg/act inhaler   No No   Sig: Inhale 2 puffs every 6 (six) hours as needed for wheezing   benzonatate (TESSALON PERLES) 100 mg capsule   No No   Sig: Take 1 capsule (100 mg total) by mouth 3 (three) times a day as needed for cough   metroNIDAZOLE (FLAGYL) 500 mg tablet   Yes No   Sig: TAKE 1 TABLET BY MOUTH TWICE A DAY FOR 7 DAYS NO ALCOHOL DURING AND UP TO 1 DAY AFTER USE      Facility-Administered Medications: None     Discharge Medication List as of 9/21/2024  5:19 AM        CONTINUE these medications which have NOT CHANGED    Details   albuterol (ProAir HFA) 90 mcg/act inhaler Inhale 2 puffs every 6 (six) hours as needed for wheezing, Starting Thu 12/14/2023, Normal      benzonatate (TESSALON PERLES) 100 mg capsule Take 1 capsule (100 mg total) by mouth 3 (three) times a day as needed for cough, Starting Wed 1/3/2024, Normal      metroNIDAZOLE (FLAGYL) 500 mg tablet TAKE 1 TABLET BY MOUTH TWICE A DAY FOR 7 DAYS NO ALCOHOL DURING AND UP TO 1 DAY AFTER USE, Historical Med           Outpatient Discharge Orders   hCG, quantitative   Standing Status:  Future Standing Exp. Date: 09/21/25        Rubén Reed PA-C  09/21/24 0635

## 2024-09-22 ENCOUNTER — TELEPHONE (OUTPATIENT)
Dept: OTHER | Facility: OTHER | Age: 29
End: 2024-09-22

## 2024-09-22 LAB — BACTERIA UR CULT: NORMAL

## 2024-09-24 ENCOUNTER — APPOINTMENT (OUTPATIENT)
Age: 29
End: 2024-09-24
Payer: COMMERCIAL

## 2024-09-24 DIAGNOSIS — O46.90 VAGINAL BLEEDING DURING PREGNANCY: ICD-10-CM

## 2024-09-24 DIAGNOSIS — Z34.90 PREGNANCY: ICD-10-CM

## 2024-09-24 LAB — B-HCG SERPL-ACNC: ABNORMAL MIU/ML (ref 0–5)

## 2024-09-24 PROCEDURE — 84702 CHORIONIC GONADOTROPIN TEST: CPT

## 2024-09-24 PROCEDURE — 36415 COLL VENOUS BLD VENIPUNCTURE: CPT

## 2024-10-22 ENCOUNTER — NURSE TRIAGE (OUTPATIENT)
Age: 29
End: 2024-10-22

## 2024-10-22 NOTE — TELEPHONE ENCOUNTER
"Chris called to f/u to ED visit/[facility] visit for vaginal bleeding in pregnancy.  Inquiring if anything needs to be done prior to visit on 10/24. She wants to continue pregnancy care with [facility]     Denies bleeding in last 2 days. Denies pelvic pain or discomfort.    Advised to continue to monitor and report any vaginal bleeding, pelvic pain or additional concerns.     Reason for Disposition  • SPOTTING lasting > 48 hours or spotting happens more than once in a week    Answer Assessment - Initial Assessment Questions  1. ONSET: \"When did this bleeding start?\"        Has been ongoing since learning of pregnancy  2. DESCRIPTION: \"Describe the bleeding that you are having.\" \"How much bleeding is there?\"     - SPOTTING: spotting, or pinkish / brownish mucous discharge; does not fill panti-liner or pad     - MILD:  less than 1 pad / hour; less than patient's usual menstrual bleeding    - MODERATE: 1-2 pads / hour; 1 menstrual cup every 6 hours; small-medium blood clots (e.g., pea, grape, small coin)    - SEVERE: soaking 2 or more pads/hour for 2 or more hours; 1 menstrual cup every 2 hours; bleeding not contained by pads or continuous red blood from vagina; large blood clots (e.g., golf ball, large coin)       Bleeding with wiping  3. ABDOMINAL PAIN SEVERITY: If present, ask: \"How bad is it?\"  (e.g., Scale 1-10; mild, moderate, or severe)    - MILD (1-3): doesn't interfere with normal activities, abdomen soft and not tender to touch     - MODERATE (4-7): interferes with normal activities or awakens from sleep, tender to touch     - SEVERE (8-10): excruciating pain, doubled over, unable to do any normal activities      denies  4. PREGNANCY: \"Do you know how many weeks or months pregnant you are?\" \"When was the first day of your last normal menstrual period?\"      + IUP on 10/9/2024 u/s measuring 7w1d    Protocols used: Pregnancy - Vaginal Bleeding Less Than 20 Weeks EGA-ADULT-OH    "

## 2024-10-22 NOTE — TELEPHONE ENCOUNTER
Regarding: f/u ER with vaginal bleeding -has 10/24 appt  ----- Message from Yanique LOYA sent at 10/22/2024 10:44 AM EDT -----  Patient was in the ER with vaginal bleeding. She does have appt 10/24. Please advise with recommendations before the appt.

## 2024-10-24 ENCOUNTER — ULTRASOUND (OUTPATIENT)
Dept: OBGYN CLINIC | Facility: CLINIC | Age: 29
End: 2024-10-24

## 2024-10-24 VITALS — DIASTOLIC BLOOD PRESSURE: 78 MMHG | WEIGHT: 211.8 LBS | SYSTOLIC BLOOD PRESSURE: 120 MMHG | BODY MASS INDEX: 35.25 KG/M2

## 2024-10-24 DIAGNOSIS — N91.1 SECONDARY AMENORRHEA: Primary | ICD-10-CM

## 2024-10-24 DIAGNOSIS — O46.90 VAGINAL BLEEDING DURING PREGNANCY: ICD-10-CM

## 2024-10-24 PROCEDURE — PNV: Performed by: OBSTETRICS & GYNECOLOGY

## 2024-10-24 RX ORDER — EPINEPHRINE 0.3 MG/.3ML
0.3 INJECTION SUBCUTANEOUS ONCE
COMMUNITY
Start: 2024-05-16

## 2024-10-24 NOTE — PROGRESS NOTES
Early ultrasound   LMP: 2024  MERI: 2025   GA: 10w1d  Patient reports nausea, vomiting, breast tenderness, fatigue, and headaches. She reports mild-heavy vaginal bleeding for three weeks but is not actively bleeding.   Denies pelvic cramping and leaking of fluid.   Planned pregnancy.   Regular menses. Cycles are 28 days and last 5 days.     Patient is accompanied with her mother (Charity)  Ultrasound Probe Disinfection    A transvaginal ultrasound was performed.   Prior to use, disinfection was performed with High Level Disinfection Process ([employer]).  Probe serial number.Probe serial number: [id number]

## 2024-10-24 NOTE — PROGRESS NOTES
Ambulatory Visit  Name: Chris Gomez      : 1995      MRN: [id number]  Encounter Provider: Gina Saba MD  Encounter Date: 10/24/2024   Encounter department: [facility] [place]    Assessment & Plan  Secondary amenorrhea  She desires to transfer care from [facility].  [facility] confirmed MERI by LMP with US at 7w5d. I recommend taking a prenatal vitamin with folic acid and DHA for the duration of the pregnancy. Introduction to the practice given and information given regarding OB nurse navigator. Plan for CF/SMA screening with PNL. MFM referral placed for NIPT and NT. Medical and surgical history reviewed.  I reviewed the common symptoms and warning signs of early pregnancy. Symptoms to be reported to providers include vaginal bleeding, pain, fevers/chills. All questions answered. I explained the way the practice works, confirmed SLOGA sites at [facility] and [facility] for appt possible and that we deliver at [facility].   Orders:    Ambulatory Referral to Maternal Fetal Medicine; Future    Ambulatory Referral to Obstetrics / Gynecology    Vaginal bleeding during pregnancy  Resolved. Rh positive         History of Present Illness     Chris Gomez is a 29 y.o. female who presents Early ultrasound   LMP: 2024; had 7w5d US at [facility]  MERI: 2025   GA: 10w1d  Patient reports nausea, vomiting, breast tenderness, fatigue, and headaches. She reports mild-heavy vaginal bleeding for three weeks but is not actively bleeding.   Denies pelvic cramping and leaking of fluid.   Planned pregnancy.   Regular menses. Cycles are 28 days and last 5 days.     Patient is accompanied with her mother (Charity)    US for + FHR: 175 bpm  Ultrasound Probe Disinfection    A transvaginal ultrasound was performed.   Prior to use, disinfection was performed with High Level Disinfection Process ([employer]).  Probe serial number.Probe serial number: [id number]    History obtained from :  patient  Review of Systems        Objective     /78 (BP Location: Left arm, Patient Position: Sitting, Cuff Size: Standard)   Wt 96.1 kg (211 lb 12.8 oz)   LMP 08/14/2024 (Approximate)   BMI 35.25 kg/m²     Physical Exam  Vitals and nursing note reviewed.   Constitutional:       General: She is not in acute distress.  Pulmonary:      Effort: Pulmonary effort is normal. No respiratory distress.   Abdominal:      General: Abdomen is flat. There is no distension.      Palpations: Abdomen is soft.      Tenderness: There is no abdominal tenderness. There is no guarding or rebound.   Genitourinary:     General: Normal vulva.   Musculoskeletal:         General: No swelling.   Skin:     General: Skin is warm and dry.   Neurological:      Mental Status: She is alert. Mental status is at baseline.

## 2024-11-13 ENCOUNTER — TELEPHONE (OUTPATIENT)
Dept: OBGYN CLINIC | Facility: CLINIC | Age: 29
End: 2024-11-13

## 2024-11-13 NOTE — TELEPHONE ENCOUNTER
Called and left message for patient expressing my sincere apologies for her experience.  We will, of course, take your concerns seriously and do reeducation and be more cognizant on potantial concerns.  We also apologize if there was a miscommunication regarding virtual interaction with your significant other, as we do not allow video in the rooms but would certainly offer live interaction.    Again, offered my sincerest apologies and wished her the best of luck going forward.